# Patient Record
(demographics unavailable — no encounter records)

---

## 2024-10-05 NOTE — XR
EXAMINATION TYPE: XR foot complete RT

 

DATE OF EXAM: 10/5/2024

 

COMPARISON: 8/6/2022

 

HISTORY: Lateral foot pain

 

TECHNIQUE: 3 view right foot

 

FINDINGS: No acute fractures or dislocations evident. Plantar calcaneal heel spur present. Joint spac
es are preserved. Soft tissues are normal. No significant interval change.

 

Follow up exams can be performed 7-10 days from acute trauma for continued pain

 

IMPRESSION:

1.  No acute osseous abnormality right foot

 

[facility], Workstation: [email or web address], 10/5/2024 12:11 PM

## 2024-10-05 NOTE — ED
Lower Extremity Injury HPI





- General


Chief Complaint: Extremity Injury, Lower


Stated Complaint: foot pain


Time Seen by Provider: 10/05/24 11:09


Source: patient, RN notes reviewed


Mode of arrival: ambulatory





- History of Present Illness


Initial Comments: 


43-year-old male presented to ER with a chief complaint of right foot pain.  

Patient states he noticed lateral right foot pain started on Thursday.  He does 

report it appears swollen and red.  He denies any known injuries or traumas.  He

states it is painful to walk and put pressure on it.  He has not had anything 

for pain at this time.  Denies any calf pain, fevers, chills or other 

complaints.





- Related Data


                                  Previous Rx's











 Medication  Instructions  Recorded


 


HYDROcodone/APAP 7.5-325MG [Norco 1 tab PO Q6HR PRN 3 Days #12 tab 03/14/20





7.5-325]  


 


Ondansetron Odt [Zofran Odt] 4 mg PO Q8HR PRN #30 tab 03/14/20


 


Tamsulosin [Flomax] 0.4 mg PO DAILY #7 cap 03/14/20


 


Colchicine 0.6 mg PO Q1H PRN #12 capsule 07/02/22


 


HYDROcodone/APAP 5-325MG [Norco 1 tab PO Q6HR PRN 3 Days #12 tab 07/02/22





5-325]  


 


Indomethacin [Indocin] 50 mg PO TID #12 capsule 07/02/22


 


predniSONE 60 mg PO DAILY #30 tab 07/02/22











                                    Allergies











Allergy/AdvReac Type Severity Reaction Status Date / Time


 


No Known Allergies Allergy   Verified 10/05/24 10:58














Review of Systems


ROS Statement: 


Those systems with pertinent positive or pertinent negative responses have been 

documented in the HPI.





ROS Other: All systems not noted in ROS Statement are negative.





Past Medical History


Past Medical History: No Reported History


Additional Past Medical History / Comment(s): scoliosis.  partially deaf.  

arthritis


History of Any Multi-Drug Resistant Organisms: None Reported


Past Surgical History: No Surgical Hx Reported


Past Psychological History: Depression


Smoking Status: Former smoker


Past Alcohol Use History: Occasional


Past Drug Use History: None Reported





General Exam


General appearance: alert, in no apparent distress


Respiratory exam: Present: normal lung sounds bilaterally.  Absent: respiratory 

distress, wheezes, rales, rhonchi, stridor


Cardiovascular Exam: Present: regular rate, normal rhythm, normal heart sounds. 

 Absent: systolic murmur, diastolic murmur, rubs, gallop, clicks


Extremities exam: Present: tenderness (Right proximal fifth metatarsal.  There 

is overlying erythema.  No calf tenderness.  Full range of motion.  No wounds.)


Neurological exam: Present: alert, oriented X3, CN II-XII intact


Psychiatric exam: Present: normal affect, normal mood


Skin exam: Present: warm, dry, intact, normal color.  Absent: rash





Course


                                   Vital Signs











  10/05/24 10/05/24





  10:54 12:47


 


Temperature 97.9 F 97.8 F


 


Pulse Rate 87 84


 


Respiratory 18 18





Rate  


 


Blood Pressure 134/91 131/88


 


O2 Sat by Pulse 96 97





Oximetry  














Medical Decision Making





- Medical Decision Making





Was pt. sent in by a medical professional or institution ([facility] Doyle, NP, urgent 

care, hospital, or nursing home...) When possible be specific


@  -No


Did you speak to anyone other than the patient for history (EMS, parent, family,

 police, friend...)? What history was obtained from this source 


@  -No


Did you review nursing and triage notes (agree or disagree)?  Why? 


@  -I reviewed and agree with nursing and triage notes


Were old charts reviewed (outside hosp., previous admission, EMS record, old 

EKG, old radiological studies, urgent care reports/EKG's, nursing home records)?

 Report findings 


@  -No old charts were reviewed


Differential Diagnosis (chest pain, altered mental status, abdominal pain women,

 abdominal pain men, vaginal bleeding, weakness, fever, dyspnea, syncope, 

headache, dizziness, GI bleed, back pain, seizure, CVA, palpatations, mental 

health, musculoskeletal)? 


@  -Differential Musculoskeletal: Muscular strain, contusion, ligament sprain, 

fracture, arthritis, septic arthritis, bursitis, cellulitis, muscle spasm, nerve

 compression, DVT, arterial occlusion, herpes zoster, electrolyte abnormality, 

tumor.... This is not meant to be in all inclusive list


EKG interpreted by me (3pts min.).


@  -None done


X-rays interpreted by me (1pt min.).


@  -Right foot x-ray negative for acute osseous process.


CT interpreted by me (1pt min.).


@  -None done


U/S interpreted by me (1pt. min.).


@  -None done


What testing was considered but not performed or refused? (CT, X-rays, U/S, 

labs)? Why?


@  -None


What meds were considered but not given or refused? Why?


@  -None


Did you discuss the management of the patient with other professionals 

(professionals i.e. , PA, NP, lab, RT, psych nurse, , , 

teacher, , )? Give summary


@  -No


Was smoking cessation discussed for >3mins.?


@  -No


Was critical care preformed (if so, how long)?


@  -No


Were there social determinants of health that impacted care today? How? 

(Homelessness, low income, unemployed, alcoholism, drug addiction, 

transportation, low edu. Level, literacy, decrease access to med. care, penitentiary, re

hab)?


@  -No


Was there de-escalation of care discussed even if they declined (Discuss DNR or 

withdrawal of care, Hospice)? DNR status


@  -No


What co-morbidities impacted this encounter? (DM, HTN, Smoking, COPD, CAD, 

Cancer, CVA, ARF, Chemo, Hep., AIDS, mental health diagnosis, sleep apnea, 

morbid obesity)?


@  -None


Was patient admitted / discharged? Hospital course, mention meds given and 

route, prescriptions, significant lab abnormalities, going to OR and other 

pertinent info.


@  -Discharge.  43-year-old male presented the ER for evaluation of right foot 

pain.  History and physical exam completed.  Vitals within normal limits.  Right

 lower extremity neurovascular intact.  There is tenderness over proximal fourth

 and fifth metatarsals.  Patient has full active range of motion.  No overlying 

skin changes.  X-rays obtained negative.  Patient received p.o. ibuprofen for 

pain control in the ER. Pain believed to me soft tissue in nature. X-ray results

 discussed with patient.  Conservative treatment options discussed.  Advise 

close follow-up with PCP, referral given. Return parameters discussed. Patient 

discharged stable condition with follow-up to PCP.  Patient verbally expressed 

understanding agree with care plan.  Case discussed with ED attending, Dr. Mireles.

 


Undiagnosed new problem with uncertain prognosis?


@  -No


Drug Therapy requiring intensive monitoring for toxicity (Heparin, Nitro, 

Insulin, Cardizem)?


@  -No


Were any procedures done?


@  -No


Diagnosis/symptom?


@  -Foot pain


Acute, or Chronic, or Acute on Chronic?


@  -Acute


Uncomplicated (without systemic symptoms) or Complicated (systemic symptoms)?


@  -Uncomplicated


Side effects of treatment?


@  -No


Exacerbation, Progression, or Severe Exacerbation?


@  -No


Poses a threat to life or bodily function? How? (Chest pain, USA, MI, pneumonia,

 PE, COPD, DKA, ARF, appy, cholecystitis, CVA, Diverticulitis, Homicidal, 

Suicidal, threat to staff... and all critical care pts)


@  -No








- Radiology Data


Radiology results: report reviewed, image reviewed





Disposition


Clinical Impression: 


 Foot pain





Disposition: HOME SELF-CARE


Condition: Stable


Instructions (If sedation given, give patient instructions):  Foot Sprain (ED)


Additional Instructions: 


Recommend over-the-counter anti-inflammatories including ibuprofen.  Continue to

 rest ice elevate and use compression.  Follow-up with PCP.  Return to the ER 

for any new or worsening concerns.


Is patient prescribed a controlled substance at d/c from ED?: No


Referrals: 


None,Stated [Primary Care Provider] - 1-2 days


Forms:   [facility]


Time of Disposition: 12:34

## 2024-12-13 NOTE — CT
EXAMINATION TYPE: CT iac wo con

 

DATE OF EXAM: 12/11/2024 8:30 AM

 

COMPARISON: None. 

 

CLINICAL INDICATION: Male, 43 years old with history of H83.8X1 DISEASES R EAR  H83.8X2 DISEASES L EA
R, Chronic hearing loss

 

TECHNIQUE: 

 

Axial images were obtained at 1 mm thick sections. Reconstructed images obtained at 1 mm thick sectio
ns. Study is without contrast.

 

FINDINGS: 

Mastoid air cells are clear.

 

External auditory canals are patent. Middle ears are clear. Internal auditory canals are normal witho
ut expansion or erosion. No cerebellar pontine angle masses are evident.

 

Cochlea and semicircular canals are normal. Incus and malleus have normal orientation.

Scutum are normal. Attics are clear.

 

There is been prior uncinectomies. Retention cyst is within the left maxillary sinus

 

IMPRESSION: 

1. NO SUSPICIOUS ACUTE ABNORMALITY TO ACCOUNT FOR PATIENT'S SYMPTOMS BILATERAL INTERNAL AUDITORY SHORTY
LS STUDY.

 

[facility], Workstation: [id number], 12/13/2024 11:29 PM